# Patient Record
Sex: FEMALE | Race: WHITE | Employment: UNEMPLOYED | URBAN - METROPOLITAN AREA
[De-identification: names, ages, dates, MRNs, and addresses within clinical notes are randomized per-mention and may not be internally consistent; named-entity substitution may affect disease eponyms.]

---

## 2017-01-25 ENCOUNTER — APPOINTMENT (OUTPATIENT)
Dept: GENERAL RADIOLOGY | Age: 42
End: 2017-01-25
Attending: EMERGENCY MEDICINE
Payer: MEDICARE

## 2017-01-25 ENCOUNTER — HOSPITAL ENCOUNTER (EMERGENCY)
Age: 42
Discharge: HOME | End: 2017-01-25
Attending: EMERGENCY MEDICINE
Payer: MEDICARE

## 2017-01-25 VITALS
TEMPERATURE: 97 F | DIASTOLIC BLOOD PRESSURE: 59 MMHG | BODY MASS INDEX: 34.36 KG/M2 | SYSTOLIC BLOOD PRESSURE: 117 MMHG | HEART RATE: 100 BPM | HEIGHT: 70 IN | OXYGEN SATURATION: 100 % | WEIGHT: 240 LBS | RESPIRATION RATE: 14 BRPM

## 2017-01-25 DIAGNOSIS — M25.562 ACUTE PAIN OF LEFT KNEE: Primary | ICD-10-CM

## 2017-01-25 PROCEDURE — 99283 EMERGENCY DEPT VISIT LOW MDM: CPT | Performed by: EMERGENCY MEDICINE

## 2017-01-25 PROCEDURE — 74011250637 HC RX REV CODE- 250/637: Performed by: EMERGENCY MEDICINE

## 2017-01-25 PROCEDURE — 73562 X-RAY EXAM OF KNEE 3: CPT

## 2017-01-25 RX ORDER — DIVALPROEX SODIUM 500 MG/1
500 TABLET, EXTENDED RELEASE ORAL ONCE
Status: COMPLETED | OUTPATIENT
Start: 2017-01-25 | End: 2017-01-25

## 2017-01-25 RX ORDER — TOPIRAMATE 50 MG/1
50 TABLET, FILM COATED ORAL ONCE
Status: COMPLETED | OUTPATIENT
Start: 2017-01-25 | End: 2017-01-25

## 2017-01-25 RX ADMIN — TOPIRAMATE 50 MG: 50 TABLET, FILM COATED ORAL at 01:51

## 2017-01-25 RX ADMIN — DIVALPROEX SODIUM 500 MG: 500 TABLET, FILM COATED, EXTENDED RELEASE ORAL at 01:51

## 2017-01-25 NOTE — ED NOTES
I have reviewed discharge instructions with the patient. The patient verbalized understanding. pt refused to sign form, was escorted to lobby to [facility] police.

## 2017-01-25 NOTE — DISCHARGE INSTRUCTIONS
Return with any fevers, swelling, redness, worsening symptoms, or additional concerns. Follow-up with your primary care doctor in one or 2 days for reevaluation.

## 2017-01-25 NOTE — ED NOTES
Patient refusing to leave. Very rude to nursing staff. Security notified to escort patient out of ED.

## 2020-10-01 NOTE — ED PROVIDER NOTES
HPI Comments: 35-year-old lady with a history of pain in her left knee after a fall and inability to take her Depakote or Topamax. Patient says that she was recently at [facility] and they gave her prescription for her Depakote and Topamax but she is unable to afford it as she is homeless. Patient said that tomorrow she has \"important biking decisions\" to make and she is worried that without her medications she will not feel to think clearly to take them. She denies any fevers, vomiting, chest pain. Patient says that when she fell she landed onto her left knee. Addition, she notes that both feet hurt and her sore and have had blisters on them. Overall she says her pain is a 4 out of 10. Elements of this note were made using speech recognition software. As such, errors of speech recognition may occur. Patient is a 39 y.o. female presenting with knee pain. The history is provided by the patient. Knee Pain           Past Medical History:   Diagnosis Date    Hypertension     Seizures ([facility] [address]        Past Surgical History:   Procedure Laterality Date    Hx cholecystectomy      Hx gyn       ectopic         History reviewed. No pertinent family history. Social History     Social History    Marital status:      Spouse name: N/A    Number of children: N/A    Years of education: N/A     Occupational History    Not on file. Social History Main Topics    Smoking status: Current Every Day Smoker    Smokeless tobacco: Not on file    Alcohol use No    Drug use: No    Sexual activity: Not on file     Other Topics Concern    Not on file     Social History Narrative         ALLERGIES: Review of patient's allergies indicates no known allergies. Review of Systems   Constitutional: Negative for chills, diaphoresis and fever. HENT: Negative for congestion, rhinorrhea and sore throat. Eyes: Negative for redness and visual disturbance.    Respiratory: Negative for cough, chest tightness, shortness of breath and wheezing. Cardiovascular: Negative for chest pain and palpitations. Gastrointestinal: Negative for abdominal pain, blood in stool, diarrhea, nausea and vomiting. Endocrine: Negative for polydipsia and polyuria. Genitourinary: Negative for dysuria and hematuria. Musculoskeletal: Positive for arthralgias and joint swelling. Negative for myalgias and neck stiffness. Skin: Negative for rash. Allergic/Immunologic: Negative for environmental allergies and food allergies. Neurological: Negative for dizziness, weakness and headaches. Hematological: Negative for adenopathy. Does not bruise/bleed easily. Psychiatric/Behavioral: Negative for confusion and sleep disturbance. The patient is not nervous/anxious. Vitals:    01/25/17 0032   BP: 117/59   Pulse: 100   Resp: 14   Temp: 97 °F (36.1 °C)   SpO2: 100%   Weight: 108.9 kg (240 lb)   Height: 5' 10\" (1.778 m)            Physical Exam   Constitutional: She is oriented to person, place, and time. She appears well-developed and well-nourished. HENT:   Head: Normocephalic and atraumatic. Eyes: Conjunctivae and EOM are normal. Pupils are equal, round, and reactive to light. Neck: Normal range of motion. Cardiovascular: Normal rate and regular rhythm. Pulmonary/Chest: Effort normal and breath sounds normal. No respiratory distress. She has no wheezes. She has no rales. She exhibits no tenderness. Abdominal: Soft. Bowel sounds are normal. There is no rebound and no guarding. Musculoskeletal: Normal range of motion. She exhibits tenderness. She exhibits no edema. Mild tenderness to the medial aspect of her left knee. No obvious swelling or deformity. Lymphadenopathy:     She has no cervical adenopathy. Neurological: She is alert and oriented to person, place, and time. Skin: Skin is warm and dry.    Patient had a blister and some mild associated tinea pedis in her third and fourth web spaces of her right foot. She had a blister on the plantar surface of her left foot just proximal to her fourth toe. Psychiatric: She has a normal mood and affect. Nursing note and vitals reviewed. MDM  Number of Diagnoses or Management Options  Diagnosis management comments: I'll give her her dose of her Depakote and her Topamax here in the emergency department. I will also check an x-ray of her left knee. X-rays unremarkable. I'll plan to discharge her.     ED Course       Procedures Negative